# Patient Record
Sex: FEMALE | Race: WHITE | NOT HISPANIC OR LATINO | Employment: PART TIME | URBAN - METROPOLITAN AREA
[De-identification: names, ages, dates, MRNs, and addresses within clinical notes are randomized per-mention and may not be internally consistent; named-entity substitution may affect disease eponyms.]

---

## 2021-10-13 ENCOUNTER — OFFICE VISIT (OUTPATIENT)
Dept: FAMILY MEDICINE CLINIC | Facility: HOSPITAL | Age: 31
End: 2021-10-13
Payer: COMMERCIAL

## 2021-10-13 VITALS
OXYGEN SATURATION: 99 % | WEIGHT: 188 LBS | HEART RATE: 60 BPM | BODY MASS INDEX: 32.1 KG/M2 | HEIGHT: 64 IN | DIASTOLIC BLOOD PRESSURE: 68 MMHG | SYSTOLIC BLOOD PRESSURE: 122 MMHG

## 2021-10-13 DIAGNOSIS — R07.81 RIB PAIN ON LEFT SIDE: ICD-10-CM

## 2021-10-13 DIAGNOSIS — N23 KIDNEY PAIN: ICD-10-CM

## 2021-10-13 DIAGNOSIS — E78.2 MIXED HYPERLIPIDEMIA: ICD-10-CM

## 2021-10-13 DIAGNOSIS — Z98.84 HISTORY OF GASTRIC BYPASS: ICD-10-CM

## 2021-10-13 DIAGNOSIS — E11.9 TYPE 2 DIABETES MELLITUS WITHOUT COMPLICATION, WITHOUT LONG-TERM CURRENT USE OF INSULIN (HCC): Primary | ICD-10-CM

## 2021-10-13 DIAGNOSIS — Z86.2 HISTORY OF ANEMIA: ICD-10-CM

## 2021-10-13 DIAGNOSIS — R07.81 RIB PAIN ON RIGHT SIDE: ICD-10-CM

## 2021-10-13 PROCEDURE — 3725F SCREEN DEPRESSION PERFORMED: CPT | Performed by: STUDENT IN AN ORGANIZED HEALTH CARE EDUCATION/TRAINING PROGRAM

## 2021-10-13 PROCEDURE — 99214 OFFICE O/P EST MOD 30 MIN: CPT | Performed by: STUDENT IN AN ORGANIZED HEALTH CARE EDUCATION/TRAINING PROGRAM

## 2021-10-13 PROCEDURE — 3008F BODY MASS INDEX DOCD: CPT | Performed by: STUDENT IN AN ORGANIZED HEALTH CARE EDUCATION/TRAINING PROGRAM

## 2021-10-13 PROCEDURE — 1036F TOBACCO NON-USER: CPT | Performed by: STUDENT IN AN ORGANIZED HEALTH CARE EDUCATION/TRAINING PROGRAM

## 2021-10-13 RX ORDER — NADOLOL 40 MG/1
40 TABLET ORAL DAILY
COMMUNITY

## 2022-01-25 ENCOUNTER — OFFICE VISIT (OUTPATIENT)
Dept: FAMILY MEDICINE CLINIC | Facility: HOSPITAL | Age: 32
End: 2022-01-25
Payer: COMMERCIAL

## 2022-01-25 VITALS
WEIGHT: 191 LBS | BODY MASS INDEX: 32.61 KG/M2 | HEART RATE: 68 BPM | OXYGEN SATURATION: 99 % | DIASTOLIC BLOOD PRESSURE: 78 MMHG | HEIGHT: 64 IN | SYSTOLIC BLOOD PRESSURE: 134 MMHG

## 2022-01-25 DIAGNOSIS — Z98.84 H/O GASTRIC BYPASS: ICD-10-CM

## 2022-01-25 DIAGNOSIS — Z11.1 SCREENING FOR TUBERCULOSIS: ICD-10-CM

## 2022-01-25 DIAGNOSIS — E11.9 DIABETES MELLITUS TYPE 2, DIET-CONTROLLED (HCC): ICD-10-CM

## 2022-01-25 DIAGNOSIS — Z00.00 HEALTH CARE MAINTENANCE: ICD-10-CM

## 2022-01-25 PROCEDURE — 1036F TOBACCO NON-USER: CPT | Performed by: INTERNAL MEDICINE

## 2022-01-25 PROCEDURE — 3008F BODY MASS INDEX DOCD: CPT | Performed by: INTERNAL MEDICINE

## 2022-01-25 PROCEDURE — 99395 PREV VISIT EST AGE 18-39: CPT | Performed by: INTERNAL MEDICINE

## 2022-01-25 PROCEDURE — 86580 TB INTRADERMAL TEST: CPT

## 2022-01-25 PROCEDURE — 3725F SCREEN DEPRESSION PERFORMED: CPT | Performed by: INTERNAL MEDICINE

## 2022-01-25 NOTE — PROGRESS NOTES
Subjective:   Chief Complaint   Patient presents with    Physical Exam        Patient ID: Marlena Robbins is a 27 y o  female  Presents for annual preventive visit and work physical form to complete    Labs are pending and will be done before next visit  Diabetes is diet controlled since her gastric bypass surgery  Likely has POTS syndrome and has been followed by cardiology for this  The following portions of the patient's history were reviewed and updated as appropriate: allergies, current medications, past family history, past medical history, past social history, past surgical history and problem list     Review of Systems   Constitutional: Negative for fatigue and fever  HENT: Negative for hearing loss  Eyes: Negative for visual disturbance  Respiratory: Negative for cough, chest tightness, shortness of breath and wheezing  Cardiovascular: Negative for chest pain, palpitations and leg swelling  Gastrointestinal: Negative for abdominal pain, diarrhea and nausea  Genitourinary: Negative for dysuria and hematuria  Musculoskeletal: Negative for arthralgias  Neurological: Negative for dizziness, numbness and headaches  Psychiatric/Behavioral: Negative for confusion and dysphoric mood  All other systems reviewed and are negative  Current Outpatient Medications on File Prior to Visit   Medication Sig Dispense Refill    nadolol (CORGARD) 40 mg tablet Take 40 mg by mouth daily       No current facility-administered medications on file prior to visit  Objective:  Vitals:    01/25/22 0914   BP: 134/78   Pulse: 68   SpO2: 99%   Weight: 86 6 kg (191 lb)   Height: 5' 4" (1 626 m)      Physical Exam  Vitals and nursing note reviewed  Constitutional:       General: She is not in acute distress  Cardiovascular:      Rate and Rhythm: Normal rate and regular rhythm  Pulmonary:      Effort: Pulmonary effort is normal       Breath sounds: Normal breath sounds  Musculoskeletal:         General: Normal range of motion  Cervical back: Normal range of motion  Skin:     Findings: No rash  Neurological:      Mental Status: She is alert and oriented to person, place, and time  Psychiatric:         Thought Content: Thought content normal          BMI Counseling: Body mass index is 32 79 kg/m²  The BMI is above normal  Nutrition recommendations include encouraging healthy choices of fruits and vegetables  Exercise recommendations include moderate physical activity 150 minutes/week  No pharmacotherapy was ordered  Rationale for BMI follow-up plan is due to patient being overweight or obese  Depression Screening and Follow-up Plan: Patient was screened for depression during today's encounter  They screened negative with a PHQ-2 score of 0  Assessment/Plan:    No problem-specific Assessment & Plan notes found for this encounter         Diagnoses and all orders for this visit:    Health care maintenance

## 2022-01-27 ENCOUNTER — CLINICAL SUPPORT (OUTPATIENT)
Dept: FAMILY MEDICINE CLINIC | Facility: HOSPITAL | Age: 32
End: 2022-01-27

## 2022-01-27 DIAGNOSIS — Z11.1 ENCOUNTER FOR PPD SKIN TEST READING: Primary | ICD-10-CM

## 2022-01-27 LAB
INDURATION: 0 MM
TB SKIN TEST: NEGATIVE

## 2022-10-12 PROBLEM — Z00.00 HEALTH CARE MAINTENANCE: Status: RESOLVED | Noted: 2022-01-25 | Resolved: 2022-10-12

## 2023-04-04 ENCOUNTER — TELEPHONE (OUTPATIENT)
Dept: FAMILY MEDICINE CLINIC | Facility: HOSPITAL | Age: 33
End: 2023-04-04

## 2023-10-04 ENCOUNTER — TELEPHONE (OUTPATIENT)
Dept: FAMILY MEDICINE CLINIC | Facility: HOSPITAL | Age: 33
End: 2023-10-04

## 2024-08-22 ENCOUNTER — OFFICE VISIT (OUTPATIENT)
Dept: FAMILY MEDICINE CLINIC | Facility: HOSPITAL | Age: 34
End: 2024-08-22
Payer: COMMERCIAL

## 2024-08-22 VITALS
HEART RATE: 82 BPM | SYSTOLIC BLOOD PRESSURE: 92 MMHG | OXYGEN SATURATION: 98 % | DIASTOLIC BLOOD PRESSURE: 66 MMHG | BODY MASS INDEX: 28.92 KG/M2 | HEIGHT: 65 IN | WEIGHT: 173.6 LBS

## 2024-08-22 DIAGNOSIS — R53.83 OTHER FATIGUE: ICD-10-CM

## 2024-08-22 DIAGNOSIS — Z13.6 SCREENING FOR CARDIOVASCULAR CONDITION: ICD-10-CM

## 2024-08-22 DIAGNOSIS — Z00.00 ANNUAL PHYSICAL EXAM: Primary | ICD-10-CM

## 2024-08-22 DIAGNOSIS — R10.84 GENERALIZED ABDOMINAL PAIN: ICD-10-CM

## 2024-08-22 DIAGNOSIS — Z23 ENCOUNTER FOR IMMUNIZATION: ICD-10-CM

## 2024-08-22 PROCEDURE — 90715 TDAP VACCINE 7 YRS/> IM: CPT

## 2024-08-22 PROCEDURE — 99213 OFFICE O/P EST LOW 20 MIN: CPT

## 2024-08-22 PROCEDURE — 99385 PREV VISIT NEW AGE 18-39: CPT

## 2024-08-22 PROCEDURE — 90471 IMMUNIZATION ADMIN: CPT

## 2024-08-22 NOTE — PROGRESS NOTES
Adult Annual Physical  Name: Adriana Rodriguez      : 1990      MRN: [id number]  Encounter Provider: Magdalena Holder DO  Encounter Date: 2024   Encounter department: [facility] [address]    Assessment & Plan   1. Annual physical exam  2. Generalized abdominal pain  Assessment & Plan:  - Suspect secondary to history of gastric bypass, possible malabsorption    Plan:  Updated lab work with CBC and BMP, follow-up results  Will refer to GI  Orders:  -     Ambulatory Referral to Gastroenterology; Future  3. Other fatigue  -     CBC and differential; Future  -     Comprehensive metabolic panel; Future  4. Screening for cardiovascular condition  -     Lipid panel; Future  5. Encounter for immunization  -     TDAP VACCINE GREATER THAN OR EQUAL TO 8YO IM    Immunizations and preventive care screenings were discussed with patient today. Appropriate education was printed on patient's after visit summary.    Counseling:  Exercise: the importance of regular exercise/physical activity was discussed. Recommend exercise 3-5 times per week for at least 30 minutes.          History of Present Illness     Adult Annual Physical:  Patient presents for annual physical. 34-year-old female past medical history of bariatric surgery gastric bypass in  presents for new patient annual physical.  Concerns include abdominal pain, nausea, vomiting, dizziness since May 2024.  Reports constant abdominal pain as well as associated headache and back of neck with radiation to forehead.  Patient states she just got insurance, so she is now wanting further evaluation for symptoms.  Patient also has a past history of diabetes, has been off medications due to lack of insurance.  Denies history of gastrointestinal issues.  Denies blood in stool, but does report bleeding with wiping.    POA is  and mother.     Diet and Physical Activity:  - Diet/Nutrition: poor diet and well balanced diet. bariatric  surgery many years ago  - Exercise: walking. walking everyday, jogging multiple times a week, gym infrequently    Depression Screening:  - PHQ-2 Score: 0    General Health:  - Sleep: 4-6 hours of sleep on average.  - Hearing: normal hearing bilateral ears.  - Vision: wears glasses.  - Dental: no dental visits for > 1 year.    /GYN Health:  - Follows with GYN: no.   - Menopause: premenopausal.   - History of STDs: no  - Contraception: IUD placement.      Advanced Care Planning:  - Has an advanced directive?: no    - Has a durable medical POA?: yes      Preventative Screening  Breast: Last mammogram denies history  Last pap unsure  FDLMP last week, once a month, lasts 2 days  Sexually active? yes  Contraception: IUD  STD screening declines  Colon: Last colonoscopy denies history    Denies Family history of breast cancer, Reports colon cancer in aunt, denies family h/o prostate cancer  Labs: Hgb A1C ordered, FLP ordered    Immunizations  Last TDAP 2013      Wt Readings from Last 3 Encounters:   08/22/24 78.7 kg (173 lb 9.6 oz)   01/25/22 86.6 kg (191 lb)   10/13/21 85.3 kg (188 lb)       PHQ-2/9 Depression Screening    Little interest or pleasure in doing things: 0 - not at all  Feeling down, depressed, or hopeless: 0 - not at all  PHQ-2 Score: 0  PHQ-2 Interpretation: Negative depression screen             Family History  HTN, HLD  Colon cancer - Aunt  Lung CA    Allergies  denies    Social History  Tobacco - denies, quit 12 years ago, started 10 years prior, was doing 12/PPD  Alcohol - 1-2x/year  Illicit Drugs - marijuana, couple times a week        Review of Systems   Constitutional:  Positive for unexpected weight change (but is not concerning for pt). Negative for chills and fever.   Respiratory:  Negative for shortness of breath.    Cardiovascular:  Negative for chest pain.   Gastrointestinal:  Positive for abdominal pain, anal bleeding (from wiping), diarrhea, nausea and vomiting. Negative for blood in stool.  "  Skin:  Negative for rash.   Allergic/Immunologic: Negative for environmental allergies and food allergies.   Neurological:  Positive for dizziness and headaches.   Psychiatric/Behavioral:  Negative for sleep disturbance.      Current Outpatient Medications on File Prior to Visit   Medication Sig Dispense Refill    [DISCONTINUED] nadolol (CORGARD) 40 mg tablet Take 40 mg by mouth daily       No current facility-administered medications on file prior to visit.      Social History     Tobacco Use    Smoking status: Former     Current packs/day: 0.50     Types: Cigarettes    Smokeless tobacco: Never    Tobacco comments:     quit 10 years ag    Vaping Use    Vaping status: Never Used   Substance and Sexual Activity    Alcohol use: Not Currently     Comment: rare    Drug use: Yes     Types: Marijuana    Sexual activity: Yes     Partners: Male     Birth control/protection: I.U.D.       Objective     BP 92/66   Pulse 82   Ht 5' 5\" (1.651 m)   Wt 78.7 kg (173 lb 9.6 oz)   SpO2 98%   BMI 28.89 kg/m²     Physical Exam  Vitals reviewed.   Constitutional:       General: She is not in acute distress.     Appearance: Normal appearance. She is well-developed. She is not ill-appearing.   HENT:      Head: Normocephalic and atraumatic.      Mouth/Throat:      Mouth: Mucous membranes are moist.   Eyes:      Conjunctiva/sclera: Conjunctivae normal.   Cardiovascular:      Rate and Rhythm: Normal rate and regular rhythm.      Heart sounds: Normal heart sounds.   Pulmonary:      Effort: Pulmonary effort is normal.      Breath sounds: Normal breath sounds.   Abdominal:      General: Bowel sounds are normal.      Palpations: Abdomen is soft.      Tenderness: There is no abdominal tenderness. There is no guarding or rebound.   Musculoskeletal:         General: Normal range of motion.   Skin:     General: Skin is warm and dry.   Neurological:      Mental Status: She is alert.      Gait: Gait normal.   Psychiatric:         Mood and " Affect: Mood normal.         Behavior: Behavior normal.         Magdalena Holder, DO  Family Medicine

## 2024-08-22 NOTE — ASSESSMENT & PLAN NOTE
- Suspect secondary to history of gastric bypass, possible malabsorption    Plan:  Updated lab work with CBC and BMP, follow-up results  Will refer to GI

## 2024-08-30 ENCOUNTER — CONSULT (OUTPATIENT)
Dept: GASTROENTEROLOGY | Facility: CLINIC | Age: 34
End: 2024-08-30
Payer: COMMERCIAL

## 2024-08-30 VITALS
WEIGHT: 171 LBS | HEIGHT: 65 IN | BODY MASS INDEX: 28.49 KG/M2 | SYSTOLIC BLOOD PRESSURE: 130 MMHG | DIASTOLIC BLOOD PRESSURE: 80 MMHG

## 2024-08-30 DIAGNOSIS — R10.84 GENERALIZED ABDOMINAL PAIN: ICD-10-CM

## 2024-08-30 DIAGNOSIS — R19.4 CHANGE IN BOWEL HABITS: Primary | ICD-10-CM

## 2024-08-30 DIAGNOSIS — R19.7 DIARRHEA, UNSPECIFIED TYPE: ICD-10-CM

## 2024-08-30 PROCEDURE — 99203 OFFICE O/P NEW LOW 30 MIN: CPT | Performed by: NURSE PRACTITIONER

## 2024-08-30 NOTE — PROGRESS NOTES
[facility] - Outpatient Consultation  Adriana Rodriguez 34 y.o. female MRN: [id number]  Encounter: [id number]    ASSESSMENT AND PLAN:      1. Generalized abdominal pain  2.  Change in bowel habits/diarrhea  Patient presents with 4-month history of change in bowel habits with watery mucousy bowel movements 3-4 times per day with associated abdominal pain which can be severe at times  Pain and bowel movements increased with food ingestion resulting in decreased p.o. intake and 20 pound weight loss over 4 months  Intermittent nausea and vomiting  Denies bloody diarrhea, scant bright red blood with wiping occasionally  Denies family history of inflammatory bowel disease, paternal aunt with colon cancer in her 40s  Suspect infectious diarrhea/colitis but differential includes inflammatory bowel disease and irritable bowel syndrome  -Check stool cultures including Giardia, ova and parasite, C. difficile and enteric bacterial panel.  Will also check fecal calprotectin  -If cultures negative for infection, would then proceed with colonoscopy  -Avoid antidiarrheals for now  -Recommend low-fat, low fiber, high carb diet with small frequent meals  -Due for routine labs per PCP.  Will add celiac panel and TSH  - Ambulatory Referral to Gastroenterology      Followup Appointment: 3 months  ______________________________________________________________________    Chief Complaint   Patient presents with   • Diarrhea     Started in May, recently it is foamy or like a thick mucous, comes on suddenly, anything pt eats or drinks she gets pain everywhere in abdomen and feels it going through, worse than period cramps and she is bloated       HPI:   Adriana Rodriguez is a 34 y.o. year old female who presents with 4-month history of change in bowel habits.  Reports developing diarrhea with watery loose bowel movements 3-4 times per day.  Reports mucousy foamy bowel movements with associated lower  abdominal pain/cramping and bloating  Denies bloody diarrhea  Bowel movements often precipitated by p.o. intake including liquids/water.  Unable to identify specific dietary triggers but has eliminated dairy from diet and using lactose-free products  No improvement with Imodium    Reports 20 pound weight loss over the past 4 months.  She is able to eat but develops nausea or diarrhea with most food intake      Denies recent travel.  No recent new medications or antibiotic use  Denies family history of inflammatory bowel disease  Paternal aunt with colon cancer in her 40s but no first-degree relatives      Historical Information   Past Medical History:   Diagnosis Date   • Allergic     Seasonal   • Anemia    • Diabetes mellitus (HCC) 2012    Started gestational   • Obesity      Past Surgical History:   Procedure Laterality Date   •  SECTION N/A    • CHOLECYSTECTOMY     • GASTRIC BYPASS     • GASTRIC BYPASS N/A     2017   • UPPER GASTROINTESTINAL ENDOSCOPY       Social History     Substance and Sexual Activity   Alcohol Use Not Currently    Comment: rare     Social History     Substance and Sexual Activity   Drug Use Yes   • Types: Marijuana     Social History     Tobacco Use   Smoking Status Former   • Current packs/day: 0.50   • Types: Cigarettes   Smokeless Tobacco Never   Tobacco Comments    quit 10 years ag      Family History   Problem Relation Age of Onset   • Hypertension Mother    • Heart disease Father    • Mental illness Sister    • Mental illness Brother    • Diabetes Maternal Grandmother    • Hypertension Maternal Grandmother    • Hyperlipidemia Maternal Grandmother    • Hypertension Maternal Grandfather    • Hyperlipidemia Maternal Grandfather    • Cancer Paternal Grandmother    • Hypertension Paternal Grandmother    • Hyperlipidemia Paternal Grandmother    • Hypertension Paternal Grandfather    • Hyperlipidemia Paternal Grandfather    • ADD / ADHD Brother    • Anxiety disorder  "Brother    • Suicide Attempts Brother        Meds/Allergies   No current outpatient medications on file.    Allergies   Allergen Reactions   • Latex Itching       PHYSICAL EXAM:    Blood pressure 130/80, height 5' 5\" (1.651 m), weight 77.6 kg (171 lb). Body mass index is 28.46 kg/m².  General Appearance: NAD, cooperative, alert  Eyes: Anicteric  ENT:  Normocephalic, atraumatic, normal mucosa.    Neck:  Supple, symmetrical, trachea midline,   Resp:  Clear to auscultation bilaterally; no rales, rhonchi or wheezing; respirations unlabored   CV:  S1 S2, Regular rate and rhythm; no murmur, rub, or gallop.  GI:  Soft, non-tender, non-distended; normal bowel sounds; no masses, no organomegaly   Rectal: Deferred  Musculoskeletal: No cyanosis, clubbing or edema. Normal ROM.  Skin:  No jaundice, rashes, or lesions   Psych: Normal affect, good eye contact  Neuro: No gross deficits, AAOx3          REVIEW OF SYSTEMS:    CONSTITUTIONAL: Denies any fever, chills, rigors, and weight loss.  HEENT: No earache or tinnitus. Denies hearing loss or visual disturbances.  CARDIOVASCULAR: No chest pain or palpitations.   RESPIRATORY: Denies any cough, hemoptysis, shortness of breath or dyspnea on exertion.  GASTROINTESTINAL: As noted in the History of Present Illness.   GENITOURINARY: No problems with urination. Denies any hematuria or dysuria.  NEUROLOGIC: No dizziness or vertigo, denies headaches.   MUSCULOSKELETAL: Denies any muscle or joint pain.   SKIN: Denies skin rashes or itching.   ENDOCRINE: Denies excessive thirst. Denies intolerance to heat or cold.  PSYCHOSOCIAL: Denies depression or anxiety. Denies any recent memory loss.   "

## 2024-10-04 ENCOUNTER — TELEPHONE (OUTPATIENT)
Dept: GASTROENTEROLOGY | Facility: CLINIC | Age: 34
End: 2024-10-04

## 2024-10-04 DIAGNOSIS — D50.9 IRON DEFICIENCY ANEMIA, UNSPECIFIED IRON DEFICIENCY ANEMIA TYPE: Primary | ICD-10-CM

## 2024-10-04 LAB
ALBUMIN SERPL-MCNC: 4.2 G/DL (ref 3.6–5.1)
ALBUMIN/GLOB SERPL: 1.8 (CALC) (ref 1–2.5)
ALP SERPL-CCNC: 42 U/L (ref 31–125)
ALT SERPL-CCNC: 10 U/L (ref 6–29)
AST SERPL-CCNC: 14 U/L (ref 10–30)
BASOPHILS # BLD AUTO: 32 CELLS/UL (ref 0–200)
BASOPHILS NFR BLD AUTO: 0.7 %
BILIRUB SERPL-MCNC: 0.8 MG/DL (ref 0.2–1.2)
BUN SERPL-MCNC: 14 MG/DL (ref 7–25)
BUN/CREAT SERPL: NORMAL (CALC) (ref 6–22)
CALCIUM SERPL-MCNC: 9 MG/DL (ref 8.6–10.2)
CHLORIDE SERPL-SCNC: 105 MMOL/L (ref 98–110)
CHOLEST SERPL-MCNC: 198 MG/DL
CHOLEST/HDLC SERPL: 2.5 (CALC)
CO2 SERPL-SCNC: 26 MMOL/L (ref 20–32)
CREAT SERPL-MCNC: 0.66 MG/DL (ref 0.5–0.97)
EOSINOPHIL # BLD AUTO: 99 CELLS/UL (ref 15–500)
EOSINOPHIL NFR BLD AUTO: 2.2 %
ERYTHROCYTE [DISTWIDTH] IN BLOOD BY AUTOMATED COUNT: 14.9 % (ref 11–15)
GFR/BSA.PRED SERPLBLD CYS-BASED-ARV: 117 ML/MIN/1.73M2
GLOBULIN SER CALC-MCNC: 2.4 G/DL (CALC) (ref 1.9–3.7)
GLUCOSE SERPL-MCNC: 82 MG/DL (ref 65–99)
HCT VFR BLD AUTO: 36.9 % (ref 35–45)
HDLC SERPL-MCNC: 79 MG/DL
HGB BLD-MCNC: 11.4 G/DL (ref 11.7–15.5)
IGA SERPL-MCNC: 253 MG/DL (ref 47–310)
LDLC SERPL CALC-MCNC: 109 MG/DL (CALC)
LYMPHOCYTES # BLD AUTO: 1395 CELLS/UL (ref 850–3900)
LYMPHOCYTES NFR BLD AUTO: 31 %
MCH RBC QN AUTO: 24.7 PG (ref 27–33)
MCHC RBC AUTO-ENTMCNC: 30.9 G/DL (ref 32–36)
MCV RBC AUTO: 80 FL (ref 80–100)
MONOCYTES # BLD AUTO: 450 CELLS/UL (ref 200–950)
MONOCYTES NFR BLD AUTO: 10 %
NEUTROPHILS # BLD AUTO: 2525 CELLS/UL (ref 1500–7800)
NEUTROPHILS NFR BLD AUTO: 56.1 %
NONHDLC SERPL-MCNC: 119 MG/DL (CALC)
PLATELET # BLD AUTO: 286 THOUSAND/UL (ref 140–400)
PMV BLD REES-ECKER: 10.8 FL (ref 7.5–12.5)
POTASSIUM SERPL-SCNC: 4.1 MMOL/L (ref 3.5–5.3)
PROT SERPL-MCNC: 6.6 G/DL (ref 6.1–8.1)
RBC # BLD AUTO: 4.61 MILLION/UL (ref 3.8–5.1)
SODIUM SERPL-SCNC: 140 MMOL/L (ref 135–146)
TRIGL SERPL-MCNC: 34 MG/DL
TSH SERPL-ACNC: 0.7 MIU/L
TTG IGA SER-ACNC: <1 U/ML
WBC # BLD AUTO: 4.5 THOUSAND/UL (ref 3.8–10.8)

## 2024-10-04 RX ORDER — FERROUS SULFATE 324(65)MG
324 TABLET, DELAYED RELEASE (ENTERIC COATED) ORAL
Qty: 30 TABLET | Refills: 5 | Status: SHIPPED | OUTPATIENT
Start: 2024-10-04

## 2024-10-04 NOTE — TELEPHONE ENCOUNTER
Spoke to patient and reviewed recent labs which showed normal thyroid and negative celiac panel.  Hemoglobin borderline at 11.4 and patient notified me that her iron levels are low per her PCP, started on iron supplement  She continues to have diarrhea and abdominal pain but has not submitted stool samples yet.  Encouraged her to submit samples as soon as possible.  If they are negative we will proceed with likely combo EGD/colonoscopy for borderline anemia

## 2024-10-04 NOTE — RESULT ENCOUNTER NOTE
Pt is anemic and cholesterol is mildly elevated.  I will send an iron supplement to pharmacy, repeat lab work in 1 year  Magdalena Holder,   Family Medicine

## 2025-01-23 ENCOUNTER — RESULTS FOLLOW-UP (OUTPATIENT)
Dept: FAMILY MEDICINE CLINIC | Facility: HOSPITAL | Age: 35
End: 2025-01-23

## 2025-01-23 NOTE — RESULT ENCOUNTER NOTE
Hemoglobin is low, pt already on iron supplement  Rest of lab work normal  Magdalena Holder, DO  Family Medicine

## 2025-05-27 ENCOUNTER — VBI (OUTPATIENT)
Dept: ADMINISTRATIVE | Facility: OTHER | Age: 35
End: 2025-05-27

## 2025-05-27 NOTE — TELEPHONE ENCOUNTER
05/27/25 3:10 PM    Patient was called to schedule a diabetic follow up apointment ; a message was left for the patient to return the call.    Thank you.  Isa Vernon MA  [facility]

## 2025-06-16 ENCOUNTER — ANNUAL EXAM (OUTPATIENT)
Dept: OBGYN CLINIC | Facility: CLINIC | Age: 35
End: 2025-06-16
Payer: COMMERCIAL

## 2025-06-16 VITALS
DIASTOLIC BLOOD PRESSURE: 62 MMHG | SYSTOLIC BLOOD PRESSURE: 106 MMHG | BODY MASS INDEX: 29.16 KG/M2 | WEIGHT: 175 LBS | HEIGHT: 65 IN

## 2025-06-16 DIAGNOSIS — Z01.419 ENCOUNTER FOR GYNECOLOGICAL EXAMINATION WITHOUT ABNORMAL FINDING: Primary | ICD-10-CM

## 2025-06-16 DIAGNOSIS — R63.5 WEIGHT GAIN FOLLOWING GASTRIC BYPASS SURGERY: ICD-10-CM

## 2025-06-16 DIAGNOSIS — Z98.84 WEIGHT GAIN FOLLOWING GASTRIC BYPASS SURGERY: ICD-10-CM

## 2025-06-16 DIAGNOSIS — R10.2 PELVIC PAIN: ICD-10-CM

## 2025-06-16 PROCEDURE — S0610 ANNUAL GYNECOLOGICAL EXAMINA: HCPCS | Performed by: NURSE PRACTITIONER

## 2025-06-16 NOTE — PROGRESS NOTES
Annual Wellness Visit  Name: Adriana Rodriguez      : 1990      MRN: [id number]  Encounter Provider: MARILUZ Olea  Encounter Date: 2025   Encounter department: [facility] [place]    34 y.o.  yo presents today for her annual exam.:  Assessment & Plan  Encounter for gynecological examination without abnormal finding  Normal gyn exam  Orders:    Thinprep Tis Pap and HPV mRNA E6/E7 Reflex HPV 16,18/45    Weight gain following gastric bypass surgery    Orders:    Ambulatory Referral to Weight Management; Future    Pelvic pain  Will evaluate with ultrasound  Orders:    US pelvis complete w transvaginal; Future             History of Present Illness     Adriana Rodriguez is a 34 y.o. female who presents for annual well woman exam. She is concerned with pelvic pain that has been present for the last 2 years. Feels like severe cramp, comparable to labor and occurs with menses and with intercourse. Pain lasts for hours to days at a time. Also complaining of weight gain over the last couple of years. Has not seen PCP or had routine labs in a few years. Hx bariatric surgery with >100 lb weight loss, slowly gaining now, interested in testing and treatment. Type 2 DM, managed with diet.     GYN:  denies vaginal discharge, labial erythema or lesions, dyspareunia.  Menses are regular, monthly, normal volume.   Contraception: Liletta 2021.  Patient is  sexually active with male partner.  No gynecologic surgeries.  Gardasil unknown    OB:   female.    :  Denies dysuria, urinary frequency or urgency.   hematuria, flank pain, incontinence.   constipation, diarrhea    Breast:   breast mass, skin changes, dimpling, reddening, nipple retraction.   breast discharge.  Patient does  have a family history of breast cancer in her aunt, no endometrial, or ovarian ca.     General:  Diet: Reviewed dietary calcium recommendations  Exercise: Reviewed recommendation of 150 minutes of  "moderate intensity exercise per week  ETOH use: No  Tobacco use: no  Recreational drug use:no     Screening:  Cervical cancer: last pap smear unknown.  Breast cancer: last mammogram in n/a.  Colon cancer: last colonoscopy n/a  STD screening: declines.  Review of Systems as per HPI       Objective   /62 (BP Location: Left arm, Patient Position: Sitting, Cuff Size: Standard)   Ht 5' 5\" (1.651 m)   Wt 79.4 kg (175 lb)   LMP 06/10/2025 (Exact Date)   Breastfeeding No   BMI 29.12 kg/m²      Physical Exam  Vitals and nursing note reviewed.   Constitutional:       General: She is not in acute distress.     Appearance: She is well-developed.   HENT:      Head: Normocephalic and atraumatic.     Eyes:      Conjunctiva/sclera: Conjunctivae normal.       Cardiovascular:      Rate and Rhythm: Normal rate and regular rhythm.      Heart sounds: No murmur heard.  Pulmonary:      Effort: Pulmonary effort is normal. No respiratory distress.      Breath sounds: Normal breath sounds.   Chest:   Breasts:     Right: Normal. No mass or skin change.      Left: Normal. No mass or skin change.   Abdominal:      Palpations: Abdomen is soft.      Tenderness: There is no abdominal tenderness.   Genitourinary:     General: Normal vulva.      Labia:         Right: No lesion.         Left: No lesion.       Urethra: No prolapse or urethral lesion.      Vagina: Normal. No vaginal discharge, bleeding or lesions.      Cervix: Normal. No cervical motion tenderness, lesion or cervical bleeding.      Uterus: Normal. Not enlarged and not tender.       Adnexa: Right adnexa normal and left adnexa normal.        Right: No mass or tenderness.          Left: No mass or tenderness.        Rectum: Normal.     Musculoskeletal:         General: No swelling.      Cervical back: Neck supple.     Skin:     General: Skin is warm and dry.      Capillary Refill: Capillary refill takes less than 2 seconds.     Neurological:      Mental Status: She is alert. "     Psychiatric:         Mood and Affect: Mood normal.

## 2025-06-18 LAB
CLINICAL INFO: NORMAL
CYTO CVX: NORMAL
CYTOLOGY CMNT CVX/VAG CYTO-IMP: NORMAL
DATE PREVIOUS BX: NORMAL
HPV E6+E7 MRNA CVX QL NAA+PROBE: NOT DETECTED
LMP START DATE: NORMAL
SL AMB PREV. PAP:: NORMAL
SPECIMEN SOURCE CVX/VAG CYTO: NORMAL

## 2025-06-19 ENCOUNTER — RESULTS FOLLOW-UP (OUTPATIENT)
Dept: OBGYN CLINIC | Facility: CLINIC | Age: 35
End: 2025-06-19

## 2025-08-04 ENCOUNTER — OFFICE VISIT (OUTPATIENT)
Dept: FAMILY MEDICINE CLINIC | Facility: HOSPITAL | Age: 35
End: 2025-08-04
Payer: COMMERCIAL

## 2025-08-04 VITALS
WEIGHT: 176.2 LBS | HEART RATE: 74 BPM | OXYGEN SATURATION: 98 % | HEIGHT: 65 IN | BODY MASS INDEX: 29.36 KG/M2 | SYSTOLIC BLOOD PRESSURE: 116 MMHG | DIASTOLIC BLOOD PRESSURE: 68 MMHG

## 2025-08-04 DIAGNOSIS — E11.9 DIABETES MELLITUS TYPE 2, DIET-CONTROLLED (HCC): ICD-10-CM

## 2025-08-04 DIAGNOSIS — E66.3 OVERWEIGHT (BMI 25.0-29.9): Primary | ICD-10-CM

## 2025-08-04 LAB — SL AMB POCT HEMOGLOBIN AIC: 5.5 (ref ?–6.5)

## 2025-08-04 PROCEDURE — 99213 OFFICE O/P EST LOW 20 MIN: CPT

## 2025-08-04 PROCEDURE — 83036 HEMOGLOBIN GLYCOSYLATED A1C: CPT
